# Patient Record
Sex: FEMALE | Race: WHITE | NOT HISPANIC OR LATINO | URBAN - METROPOLITAN AREA
[De-identification: names, ages, dates, MRNs, and addresses within clinical notes are randomized per-mention and may not be internally consistent; named-entity substitution may affect disease eponyms.]

---

## 2022-12-22 ENCOUNTER — TELEPHONE (OUTPATIENT)
Dept: OBSTETRICS AND GYNECOLOGY | Facility: CLINIC | Age: 37
End: 2022-12-22

## 2022-12-22 ENCOUNTER — LAB (OUTPATIENT)
Dept: OBSTETRICS AND GYNECOLOGY | Facility: CLINIC | Age: 37
End: 2022-12-22

## 2022-12-22 DIAGNOSIS — O20.0 THREATENED ABORTION: Primary | ICD-10-CM

## 2022-12-22 NOTE — TELEPHONE ENCOUNTER
LMP 11/11/2022 aprox 5weeks 6 days with brown vaginal spotting for almost a week, without recent intercourse, straining to have BM or heavy lifting. She is 37 years old G1. She states her blood type is O+ her NOB is 1/10/23 with Dr. Diallo. Come in for hcg and prog today - I sent hemalathat to her email. She knows to go the [facility] for emergent pain or bleeding and call tomorrow for her labs.

## 2022-12-22 NOTE — TELEPHONE ENCOUNTER
Caller: AYAN BLAND    Relationship to patient: SELF    Best call back number: [phone]    Patient is needing:PT IS 6WKS PREGNANT HAS APPT ON 1/10/23 BUT HAS BEEN SPOTTING - BROWNISH COLOR, SHE HAS BEEN SPOTTING EVERY DAY AND WOULD LIKE TO KNOW IF THIS IS NORMAL

## 2022-12-23 ENCOUNTER — LAB (OUTPATIENT)
Dept: OBSTETRICS AND GYNECOLOGY | Facility: CLINIC | Age: 37
End: 2022-12-23

## 2022-12-23 DIAGNOSIS — O20.0 THREATENED ABORTION: Primary | ICD-10-CM

## 2022-12-23 LAB
HCG INTACT+B SERPL-ACNC: 672 MIU/ML
HCG INTACT+B SERPL-ACNC: 789 MIU/ML
PROGEST SERPL-MCNC: 4.7 NG/ML

## 2022-12-24 ENCOUNTER — DOCUMENTATION (OUTPATIENT)
Dept: OBSTETRICS AND GYNECOLOGY | Facility: CLINIC | Age: 37
End: 2022-12-24

## 2022-12-24 DIAGNOSIS — O20.0 THREATENED ABORTION: Primary | ICD-10-CM

## 2022-12-24 RX ORDER — PROGESTERONE 100 MG/1
100 CAPSULE ORAL DAILY
Qty: 30 CAPSULE | Refills: 1 | Status: SHIPPED | OUTPATIENT
Start: 2022-12-24

## 2022-12-24 NOTE — PROGRESS NOTES
Patient called for hCG results.  hCG went from 6  in 1 day she continues to spot.  Progesterone was low at 4 Center and Prometrium 100 mg once a day to take she will see Dr. Izaguirre in the future.  Ectopic precautions were given